# Patient Record
Sex: MALE | Race: BLACK OR AFRICAN AMERICAN | Employment: FULL TIME | URBAN - METROPOLITAN AREA
[De-identification: names, ages, dates, MRNs, and addresses within clinical notes are randomized per-mention and may not be internally consistent; named-entity substitution may affect disease eponyms.]

---

## 2017-05-10 ENCOUNTER — ANESTHESIA EVENT (OUTPATIENT)
Dept: SURGERY | Age: 67
End: 2017-05-10
Payer: COMMERCIAL

## 2017-05-11 ENCOUNTER — ANESTHESIA (OUTPATIENT)
Dept: SURGERY | Age: 67
End: 2017-05-11
Payer: COMMERCIAL

## 2017-05-11 ENCOUNTER — HOSPITAL ENCOUNTER (OUTPATIENT)
Age: 67
Setting detail: OUTPATIENT SURGERY
Discharge: HOME | End: 2017-05-11
Attending: UROLOGY | Admitting: UROLOGY
Payer: COMMERCIAL

## 2017-05-11 VITALS
WEIGHT: 173 LBS | SYSTOLIC BLOOD PRESSURE: 145 MMHG | HEIGHT: 67 IN | OXYGEN SATURATION: 100 % | HEART RATE: 52 BPM | BODY MASS INDEX: 27.15 KG/M2 | TEMPERATURE: 97.8 F | DIASTOLIC BLOOD PRESSURE: 70 MMHG | RESPIRATION RATE: 16 BRPM

## 2017-05-11 PROBLEM — N40.1 BPH (BENIGN PROSTATIC HYPERTROPHY) WITH URINARY OBSTRUCTION: Status: ACTIVE | Noted: 2017-05-11

## 2017-05-11 PROBLEM — N21.0 URINARY BLADDER CALCULUS: Status: ACTIVE | Noted: 2017-05-11

## 2017-05-11 PROBLEM — N13.8 BPH (BENIGN PROSTATIC HYPERTROPHY) WITH URINARY OBSTRUCTION: Status: ACTIVE | Noted: 2017-05-11

## 2017-05-11 PROCEDURE — 74011000258 HC RX REV CODE- 258

## 2017-05-11 PROCEDURE — 77030018836 HC SOL IRR NACL ICUM -A: Performed by: UROLOGY

## 2017-05-11 PROCEDURE — 77030018846 HC SOL IRR STRL H20 ICUM -A: Performed by: UROLOGY

## 2017-05-11 PROCEDURE — 74011250636 HC RX REV CODE- 250/636

## 2017-05-11 PROCEDURE — 77030018832 HC SOL IRR H20 ICUM -A: Performed by: UROLOGY

## 2017-05-11 PROCEDURE — 76210000021 HC REC RM PH II 0.5 TO 1 HR: Performed by: UROLOGY

## 2017-05-11 PROCEDURE — 76210000006 HC OR PH I REC 0.5 TO 1 HR: Performed by: UROLOGY

## 2017-05-11 PROCEDURE — 74011000250 HC RX REV CODE- 250

## 2017-05-11 PROCEDURE — 77030011640 HC PAD GRND REM COVD -A: Performed by: UROLOGY

## 2017-05-11 PROCEDURE — 76010000160 HC OR TIME 0.5 TO 1 HR INTENSV-TIER 1: Performed by: UROLOGY

## 2017-05-11 PROCEDURE — 77030032490 HC SLV COMPR SCD KNE COVD -B: Performed by: UROLOGY

## 2017-05-11 PROCEDURE — 74011250637 HC RX REV CODE- 250/637

## 2017-05-11 PROCEDURE — 77030013079 HC BLNKT BAIR HGGR 3M -A: Performed by: NURSE ANESTHETIST, CERTIFIED REGISTERED

## 2017-05-11 PROCEDURE — 76060000032 HC ANESTHESIA 0.5 TO 1 HR: Performed by: UROLOGY

## 2017-05-11 PROCEDURE — 82360 CALCULUS ASSAY QUANT: CPT | Performed by: UROLOGY

## 2017-05-11 PROCEDURE — 74011250636 HC RX REV CODE- 250/636: Performed by: NURSE ANESTHETIST, CERTIFIED REGISTERED

## 2017-05-11 PROCEDURE — 77030012510 HC MSK AIRWY LMA TELE -B: Performed by: NURSE ANESTHETIST, CERTIFIED REGISTERED

## 2017-05-11 PROCEDURE — 77030018706 HC CORD MPLR COVD -A: Performed by: UROLOGY

## 2017-05-11 RX ORDER — FENTANYL CITRATE 50 UG/ML
INJECTION, SOLUTION INTRAMUSCULAR; INTRAVENOUS AS NEEDED
Status: DISCONTINUED | OUTPATIENT
Start: 2017-05-11 | End: 2017-05-11 | Stop reason: HOSPADM

## 2017-05-11 RX ORDER — FAMOTIDINE 20 MG/1
TABLET, FILM COATED ORAL
Status: COMPLETED
Start: 2017-05-11 | End: 2017-05-11

## 2017-05-11 RX ORDER — HYDROMORPHONE HYDROCHLORIDE 2 MG/ML
0.5 INJECTION, SOLUTION INTRAMUSCULAR; INTRAVENOUS; SUBCUTANEOUS
Status: DISCONTINUED | OUTPATIENT
Start: 2017-05-11 | End: 2017-05-11

## 2017-05-11 RX ORDER — LEVOFLOXACIN 500 MG/1
500 TABLET, FILM COATED ORAL DAILY
Qty: 7 TAB | Refills: 0 | Status: SHIPPED | OUTPATIENT
Start: 2017-05-11 | End: 2017-05-18

## 2017-05-11 RX ORDER — MIDAZOLAM HYDROCHLORIDE 1 MG/ML
INJECTION, SOLUTION INTRAMUSCULAR; INTRAVENOUS AS NEEDED
Status: DISCONTINUED | OUTPATIENT
Start: 2017-05-11 | End: 2017-05-11 | Stop reason: HOSPADM

## 2017-05-11 RX ORDER — FAMOTIDINE 20 MG/1
20 TABLET, FILM COATED ORAL ONCE
Status: COMPLETED | OUTPATIENT
Start: 2017-05-12 | End: 2017-05-11

## 2017-05-11 RX ORDER — PROPOFOL 10 MG/ML
INJECTION, EMULSION INTRAVENOUS AS NEEDED
Status: DISCONTINUED | OUTPATIENT
Start: 2017-05-11 | End: 2017-05-11 | Stop reason: HOSPADM

## 2017-05-11 RX ORDER — LIDOCAINE HYDROCHLORIDE 20 MG/ML
INJECTION, SOLUTION EPIDURAL; INFILTRATION; INTRACAUDAL; PERINEURAL AS NEEDED
Status: DISCONTINUED | OUTPATIENT
Start: 2017-05-11 | End: 2017-05-11 | Stop reason: HOSPADM

## 2017-05-11 RX ORDER — OXYCODONE AND ACETAMINOPHEN 5; 325 MG/1; MG/1
1 TABLET ORAL
Qty: 10 TAB | Refills: 0 | Status: SHIPPED | OUTPATIENT
Start: 2017-05-11 | End: 2017-11-08 | Stop reason: ALTCHOICE

## 2017-05-11 RX ORDER — HYDROMORPHONE HYDROCHLORIDE 2 MG/ML
0.5 INJECTION, SOLUTION INTRAMUSCULAR; INTRAVENOUS; SUBCUTANEOUS
Status: DISCONTINUED | OUTPATIENT
Start: 2017-05-11 | End: 2017-05-11 | Stop reason: HOSPADM

## 2017-05-11 RX ORDER — ONDANSETRON 2 MG/ML
4 INJECTION INTRAMUSCULAR; INTRAVENOUS ONCE
Status: DISCONTINUED | OUTPATIENT
Start: 2017-05-11 | End: 2017-05-11 | Stop reason: HOSPADM

## 2017-05-11 RX ORDER — FENTANYL CITRATE 50 UG/ML
50 INJECTION, SOLUTION INTRAMUSCULAR; INTRAVENOUS
Status: DISCONTINUED | OUTPATIENT
Start: 2017-05-11 | End: 2017-05-11 | Stop reason: HOSPADM

## 2017-05-11 RX ORDER — SODIUM CHLORIDE, SODIUM LACTATE, POTASSIUM CHLORIDE, CALCIUM CHLORIDE 600; 310; 30; 20 MG/100ML; MG/100ML; MG/100ML; MG/100ML
50 INJECTION, SOLUTION INTRAVENOUS CONTINUOUS
Status: DISCONTINUED | OUTPATIENT
Start: 2017-05-12 | End: 2017-05-11 | Stop reason: HOSPADM

## 2017-05-11 RX ORDER — FENTANYL CITRATE 50 UG/ML
50 INJECTION, SOLUTION INTRAMUSCULAR; INTRAVENOUS AS NEEDED
Status: DISCONTINUED | OUTPATIENT
Start: 2017-05-11 | End: 2017-05-11

## 2017-05-11 RX ORDER — LEVOFLOXACIN 5 MG/ML
INJECTION, SOLUTION INTRAVENOUS AS NEEDED
Status: DISCONTINUED | OUTPATIENT
Start: 2017-05-11 | End: 2017-05-11 | Stop reason: HOSPADM

## 2017-05-11 RX ORDER — PHENAZOPYRIDINE HYDROCHLORIDE 100 MG/1
100 TABLET, FILM COATED ORAL
Qty: 12 TAB | Refills: 0 | Status: SHIPPED | OUTPATIENT
Start: 2017-05-11 | End: 2017-05-14

## 2017-05-11 RX ORDER — SODIUM CHLORIDE 0.9 % (FLUSH) 0.9 %
5-10 SYRINGE (ML) INJECTION EVERY 8 HOURS
Status: DISCONTINUED | OUTPATIENT
Start: 2017-05-11 | End: 2017-05-11 | Stop reason: HOSPADM

## 2017-05-11 RX ORDER — DOCUSATE SODIUM 100 MG/1
100 CAPSULE, LIQUID FILLED ORAL 2 TIMES DAILY
Qty: 60 CAP | Refills: 0 | Status: SHIPPED | OUTPATIENT
Start: 2017-05-11 | End: 2017-06-10

## 2017-05-11 RX ORDER — SODIUM CHLORIDE, SODIUM LACTATE, POTASSIUM CHLORIDE, CALCIUM CHLORIDE 600; 310; 30; 20 MG/100ML; MG/100ML; MG/100ML; MG/100ML
125 INJECTION, SOLUTION INTRAVENOUS CONTINUOUS
Status: DISCONTINUED | OUTPATIENT
Start: 2017-05-11 | End: 2017-05-11 | Stop reason: HOSPADM

## 2017-05-11 RX ORDER — TAMSULOSIN HYDROCHLORIDE 0.4 MG/1
0.8 CAPSULE ORAL
Qty: 180 CAP | Refills: 0 | Status: SHIPPED | OUTPATIENT
Start: 2017-05-11 | End: 2017-08-09

## 2017-05-11 RX ORDER — SODIUM CHLORIDE 0.9 % (FLUSH) 0.9 %
5-10 SYRINGE (ML) INJECTION AS NEEDED
Status: DISCONTINUED | OUTPATIENT
Start: 2017-05-11 | End: 2017-05-11 | Stop reason: HOSPADM

## 2017-05-11 RX ADMIN — MIDAZOLAM HYDROCHLORIDE 2 MG: 1 INJECTION, SOLUTION INTRAMUSCULAR; INTRAVENOUS at 12:22

## 2017-05-11 RX ADMIN — LIDOCAINE HYDROCHLORIDE 50 MG: 20 INJECTION, SOLUTION EPIDURAL; INFILTRATION; INTRACAUDAL; PERINEURAL at 12:27

## 2017-05-11 RX ADMIN — FAMOTIDINE 20 MG: 20 TABLET, FILM COATED ORAL at 10:56

## 2017-05-11 RX ADMIN — PROPOFOL 170 MG: 10 INJECTION, EMULSION INTRAVENOUS at 12:27

## 2017-05-11 RX ADMIN — FENTANYL CITRATE 50 MCG: 50 INJECTION, SOLUTION INTRAMUSCULAR; INTRAVENOUS at 12:51

## 2017-05-11 RX ADMIN — SODIUM CHLORIDE, SODIUM LACTATE, POTASSIUM CHLORIDE, AND CALCIUM CHLORIDE 50 ML/HR: 600; 310; 30; 20 INJECTION, SOLUTION INTRAVENOUS at 10:52

## 2017-05-11 RX ADMIN — FENTANYL CITRATE 50 MCG: 50 INJECTION, SOLUTION INTRAMUSCULAR; INTRAVENOUS at 12:27

## 2017-05-11 RX ADMIN — LEVOFLOXACIN 500 MG: 5 INJECTION, SOLUTION INTRAVENOUS at 12:27

## 2017-05-11 NOTE — DISCHARGE INSTRUCTIONS
Discharge Instructions      Patient: Elli Dozier               Sex: male          DOA: 5/11/2017         YOB: 1950      Age:  61 y.o.        LOS:  LOS: 0 days     ACUTE DIAGNOSES:  bladder stone n21.1    CHRONIC MEDICAL DIAGNOSES:  Problem List as of 5/11/2017  Date Reviewed: 4/30/2017          Codes Class Noted - Resolved    BPH (benign prostatic hypertrophy) with urinary obstruction ICD-10-CM: N40.1, N13.8  ICD-9-CM: 600.01, 599.69  5/11/2017 - Present        Urinary bladder calculus ICD-10-CM: N21.0  ICD-9-CM: 594.1  5/11/2017 - Present        Chronic hepatitis C ([facility] [address] ICD-10-CM: B18.2  ICD-9-CM: 070.54  12/21/2011 - Present    Overview Signed 12/21/2011 10:25 AM by Goran Hayes MD     Genotype 1                   DISCHARGE MEDICATIONS:       · It is important that you take the medication exactly as they are prescribed. · Keep your medication in the bottles provided by the pharmacist and keep a list of the medication names, dosages, and times to be taken in your wallet. · Do not take other medications without consulting your doctor. DIET:  Regular Diet    ACTIVITY: No lifting, Driving, or Strenuous exercise for 6 days    ADDITIONAL INFORMATION: If you experience any of the following symptoms then please call your primary care physician or return to the emergency room if you cannot get hold of your doctor: Fever, chills, nausea, vomiting, diarrhea, change in mentation, falling, bleeding, shortness of breath. FOLLOW UP CARE:  Dr. Sukumar Whittaker MD as regularly scheduled. Follow-up with Ryan Becerril MD in 10 days for recheck. Information obtained by :  I understand that if any problems occur once I am at home I am to contact my physician. I understand and acknowledge receipt of the instructions indicated above.                                                                                                                                            Physician's or R.N.'s Signature                                                                  Date/Time                                                                                                                                                Patient or Representative Signature                                                          Date/Time      DISCHARGE SUMMARY from Nurse    The following personal items are in your possession at time of discharge:    Dental Appliances: None  Visual Aid: Glasses     Home Medications: None  Jewelry: None  Clothing: Pants, Shirt, Footwear, Socks, Undergarments  Other Valuables: Eyeglasses (everything given to wife)             PATIENT INSTRUCTIONS:    After general anesthesia or intravenous sedation, for 24 hours or while taking prescription Narcotics:  · Limit your activities  · Do not drive and operate hazardous machinery  · Do not make important personal or business decisions  · Do  not drink alcoholic beverages  · If you have not urinated within 8 hours after discharge, please contact your surgeon on call. Report the following to your surgeon:  · Excessive pain, swelling, redness or odor of or around the surgical area  · Temperature over 100.5  · Nausea and vomiting lasting longer than 4 hours or if unable to take medications  · Any signs of decreased circulation or nerve impairment to extremity: change in color, persistent  numbness, tingling, coldness or increase pain  · Any questions        What to do at Home:  These are general instructions for a healthy lifestyle:    No smoking/ No tobacco products/ Avoid exposure to second hand smoke    Surgeon General's Warning:  Quitting smoking now greatly reduces serious risk to your health.     Obesity, smoking, and sedentary lifestyle greatly increases your risk for illness    A healthy diet, regular physical exercise & weight monitoring are important for maintaining a healthy lifestyle    You may be retaining fluid if you have a history of heart failure or if you experience any of the following symptoms:  Weight gain of 3 pounds or more overnight or 5 pounds in a week, increased swelling in our hands or feet or shortness of breath while lying flat in bed. Please call your doctor as soon as you notice any of these symptoms; do not wait until your next office visit. Recognize signs and symptoms of STROKE:    F-face looks uneven    A-arms unable to move or move unevenly    S-speech slurred or non-existent    T-time-call 911 as soon as signs and symptoms begin-DO NOT go       Back to bed or wait to see if you get better-TIME IS BRAIN. Warning Signs of HEART ATTACK     Call 911 if you have these symptoms:   Chest discomfort. Most heart attacks involve discomfort in the center of the chest that lasts more than a few minutes, or that goes away and comes back. It can feel like uncomfortable pressure, squeezing, fullness, or pain.  Discomfort in other areas of the upper body. Symptoms can include pain or discomfort in one or both arms, the back, neck, jaw, or stomach.  Shortness of breath with or without chest discomfort.  Other signs may include breaking out in a cold sweat, nausea, or lightheadedness. Don't wait more than five minutes to call 911 - MINUTES MATTER! Fast action can save your life. Calling 911 is almost always the fastest way to get lifesaving treatment. Emergency Medical Services staff can begin treatment when they arrive -- up to an hour sooner than if someone gets to the hospital by car. The discharge information has been reviewed with the patient. The patient verbalized understanding. Discharge medications reviewed with the patient and appropriate educational materials and side effects teaching were provided. Patient armband removed and given to patient to take home.   Patient was informed of the privacy risks if armband lost or stolen

## 2017-05-11 NOTE — ANESTHESIA POSTPROCEDURE EVALUATION
Post-Anesthesia Evaluation & Assessment    Visit Vitals    /71    Pulse (!) 50    Temp 36.6 °C (97.8 °F)    Resp 13    Ht 5' 7\" (1.702 m)    Wt 78.5 kg (173 lb)    SpO2 98%    BMI 27.1 kg/m2       Post-operative hydration adequate. Pain score (VAS): 0 Pain Scale 1: Numeric (0 - 10) (05/11/17 1330)  Pain Intensity 1: 0 (05/11/17 1330)   Managed. Mental status & Level of consciousness: returned to baseline    Neurological status: returned to baseline    Pulmonary status: airway patent, oxygen given as needed. Complications related to anesthesia: none    Patient has met all discharge requirements.     Additional comments:        Lowell Purdy MD  May 11, 2017

## 2017-05-11 NOTE — BRIEF OP NOTE
BRIEF OPERATIVE NOTE    Date of Procedure: 5/11/2017   Preoperative Diagnosis: bladder stone n21.1  Postoperative Diagnosis: bladder stone n21.1    Procedure(s):  Cystoscopy with  Cystolitholapaxy using Holmium laser. Fulguration of prostate bleeding. Surgeon(s) and Role:     * Demario Yost MD - Primary         Surgical Staff:  Circ-1: Toan Meza RN  Scrub Tech-1: Griffin Koyanagi  Event Time In   Incision Start 12:38 PM   Incision Close 12:55 PM     Anesthesia: General   Estimated Blood Loss: minimal  Specimens:   ID Type Source Tests Collected by Time Destination   1 : BLADDER STONES FOR ANALYSIS  Bladder  Demario Yost MD 5/11/2017  1:03 PM Pathology      Findings: large bilobar prostatic hypertrophy with obstruction, Trigone and ureteral orifices seen readily. 2+ bladder trabeculations with no bladder tumors. Elongated 3 cm bladder stone well fragmented with Holmium laser 5.4 micron laser fiber fiber. All fragments were completely irrigated out and no fragments seen after termination of the procedure. There was some friable prostate tissue on small median lobe and lateral lobes at bladder neck. These bleeders we fulgurated and no active bleeding noted after fulguration.   Complications: none    Demario Yost MD    Implants: * No implants in log *

## 2017-05-11 NOTE — IP AVS SNAPSHOT
[address] [place] [address] [address] 
[phone] Patient: Randolph Hernandez MRN: [id number] AGUILA:[birth year] You are allergic to the following Allergen Reactions Ancef (Cefazolin) Hives Swells Recent Documentation Height Weight BMI Smoking Status 1.702 m 78.5 kg 27.1 kg/m2 Former Smoker Emergency Contacts Name Discharge Info Relation Home Work Mobile Radha Hanson DISCHARGE CAREGIVER [3] Spouse [3] [phone] About your hospitalization You were admitted on:  May 11, 2017 You last received care in the:  [facility] PHASE 2 RECOVERY You were discharged on:  May 11, 2017 Unit phone number:  [phone] Why you were hospitalized Your primary diagnosis was:  Not on File Your diagnoses also included:  Urinary Bladder Calculus Providers Seen During Your Hospitalizations Provider Role Specialty Primary office phone Reymundo Bernard MD Attending Provider Urology [phone] Your Primary Care Physician (PCP) Primary Care Physician Office Phone Office Fax [address]  [phone] Follow-up Information Follow up With Details Comments Contact Info [facility] [address] [place] [address] [phone] Your Appointments Wednesday May 31, 2017  9:45 AM EDT Any with Fouzia Dent MD  
[facility] [place] [address] [place] [address]  
[phone] Current Discharge Medication List  
  
START taking these medications Dose & Instructions Dispensing Information Comments Morning Noon Evening Bedtime  
 docusate sodium 100 mg capsule Commonly known as:  Sandra Pulido Your last dose was:     
   
Your next dose is:    
   
   
 Dose:  100 mg  
 Take 1 Cap by mouth two (2) times a day for 30 days. Quantity:  60 Cap Refills:  0  
     
   
   
   
  
 levoFLOXacin 500 mg tablet Commonly known as:  James Clement Your last dose was: Your next dose is:    
   
   
 Dose:  500 mg Take 1 Tab by mouth daily for 7 days. Quantity:  7 Tab Refills:  0  
     
   
   
   
  
 oxyCODONE-acetaminophen 5-325 mg per tablet Commonly known as:  PERCOCET Your last dose was: Your next dose is:    
   
   
 Dose:  1 Tab Take 1 Tab by mouth every four (4) hours as needed for Pain. Max Daily Amount: 6 Tabs. Quantity:  10 Tab Refills:  0 phenazopyridine 100 mg tablet Commonly known as:  PYRIDIUM Your last dose was: Your next dose is:    
   
   
 Dose:  100 mg Take 1 Tab by mouth three (3) times daily as needed for Pain for up to 3 days. Quantity:  12 Tab Refills:  0 CONTINUE these medications which have CHANGED Dose & Instructions Dispensing Information Comments Morning Noon Evening Bedtime  
 tamsulosin 0.4 mg capsule Commonly known as:  FLOMAX What changed:   
- how much to take 
- how to take this - when to take this Your last dose was: Your next dose is:    
   
   
 Dose:  0.8 mg Take 2 Caps by mouth daily (after dinner) for 90 days. Quantity:  180 Cap Refills:  0 CONTINUE these medications which have NOT CHANGED Dose & Instructions Dispensing Information Comments Morning Noon Evening Bedtime FISH -160-1,000 mg Cap Generic drug:  omega 3-dha-epa-fish oil Your last dose was: Your next dose is: Take  by mouth. Refills:  0  
     
   
   
   
  
 VITAMIN D3 1,000 unit tablet Generic drug:  cholecalciferol Your last dose was: Your next dose is: Take  by mouth daily. Refills:  0 Where to Get Your Medications Information on where to get these meds will be given to you by the nurse or doctor. ! Ask your nurse or doctor about these medications  
  docusate sodium 100 mg capsule  
 levoFLOXacin 500 mg tablet  
 oxyCODONE-acetaminophen 5-325 mg per tablet  
 phenazopyridine 100 mg tablet  
 tamsulosin 0.4 mg capsule Discharge Instructions Discharge Instructions Patient: Fabiano Cummings               Sex: male          DOA: 5/11/2017 YOB: 1950      Age:  61 y.o.        LOS:  LOS: 0 days ACUTE DIAGNOSES: 
bladder stone n21.1 CHRONIC MEDICAL DIAGNOSES: 
Problem List as of 5/11/2017  Date Reviewed: 4/30/2017 Codes Class Noted - Resolved BPH (benign prostatic hypertrophy) with urinary obstruction ICD-10-CM: N40.1, N13.8 ICD-9-CM: 600.01, 599.69  5/11/2017 - Present Urinary bladder calculus ICD-10-CM: N21.0 ICD-9-CM: 594.1  5/11/2017 - Present [facility] ICD-10-CM: B18.2 ICD-9-CM: 070.54  12/21/2011 - Present Overview Signed 12/21/2011 10:25 AM by Arely Crowe MD  
  Genotype 1 DISCHARGE MEDICATIONS:  
 
 
· It is important that you take the medication exactly as they are prescribed. · Keep your medication in the bottles provided by the pharmacist and keep a list of the medication names, dosages, and times to be taken in your wallet. · Do not take other medications without consulting your doctor. DIET:  Regular Diet ACTIVITY: No lifting, Driving, or Strenuous exercise for 6 days ADDITIONAL INFORMATION: If you experience any of the following symptoms then please call your primary care physician or return to the emergency room if you cannot get hold of your doctor: Fever, chills, nausea, vomiting, diarrhea, change in mentation, falling, bleeding, shortness of breath. FOLLOW UP CARE: 
Dr. Chantale Hartman MD as regularly scheduled. Follow-up with Yaritza Horne MD in 10 days for recheck. Information obtained by : 
I understand that if any problems occur once I am at home I am to contact my physician. I understand and acknowledge receipt of the instructions indicated above. Physician's or R.N.'s Signature                                                                  Date/Time Patient or Representative Signature                                                          Date/Time DISCHARGE SUMMARY from Nurse The following personal items are in your possession at time of discharge: 
 
Dental Appliances: None Visual Aid: Glasses Home Medications: None Jewelry: None Clothing: Pants, Shirt, Footwear, Socks, Undergarments Other Valuables: Eyeglasses (everything given to wife) PATIENT INSTRUCTIONS: 
 
After general anesthesia or intravenous sedation, for 24 hours or while taking prescription Narcotics: · Limit your activities · Do not drive and operate hazardous machinery · Do not make important personal or business decisions · Do  not drink alcoholic beverages · If you have not urinated within 8 hours after discharge, please contact your surgeon on call. Report the following to your surgeon: 
· Excessive pain, swelling, redness or odor of or around the surgical area · Temperature over 100.5 · Nausea and vomiting lasting longer than 4 hours or if unable to take medications · Any signs of decreased circulation or nerve impairment to extremity: change in color, persistent  numbness, tingling, coldness or increase pain · Any questions What to do at Home: These are general instructions for a healthy lifestyle: No smoking/ No tobacco products/ Avoid exposure to second hand smoke Surgeon General's Warning:  Quitting smoking now greatly reduces serious risk to your health. Obesity, smoking, and sedentary lifestyle greatly increases your risk for illness A healthy diet, regular physical exercise & weight monitoring are important for maintaining a healthy lifestyle You may be retaining fluid if you have a history of heart failure or if you experience any of the following symptoms:  Weight gain of 3 pounds or more overnight or 5 pounds in a week, increased swelling in our hands or feet or shortness of breath while lying flat in bed. Please call your doctor as soon as you notice any of these symptoms; do not wait until your next office visit. Recognize signs and symptoms of STROKE: 
 
F-face looks uneven A-arms unable to move or move unevenly S-speech slurred or non-existent T-time-call 911 as soon as signs and symptoms begin-DO NOT go Back to bed or wait to see if you get better-TIME IS BRAIN. Warning Signs of HEART ATTACK Call 911 if you have these symptoms: 
? Chest discomfort. Most heart attacks involve discomfort in the center of the chest that lasts more than a few minutes, or that goes away and comes back. It can feel like uncomfortable pressure, squeezing, fullness, or pain. ? Discomfort in other areas of the upper body. Symptoms can include pain or discomfort in one or both arms, the back, neck, jaw, or stomach. ? Shortness of breath with or without chest discomfort. ? Other signs may include breaking out in a cold sweat, nausea, or lightheadedness. Don't wait more than five minutes to call [address]! Fast action can save your life. Calling 911 is almost always the fastest way to get lifesaving treatment. Emergency Medical Services staff can begin treatment when they arrive  up to an hour sooner than if someone gets to the hospital by car. The discharge information has been reviewed with the patient. The patient verbalized understanding. Discharge medications reviewed with the patient and appropriate educational materials and side effects teaching were provided. Patient armband removed and given to patient to take home. Patient was informed of the privacy risks if armband lost or stolen Discharge Orders None Introducing [facility]! Jessikahilario Grijalva introduces [employer] patient portal. Now you can access parts of your medical record, email your doctor's office, and request medication refills online. 1. In your internet browser, go to [email or web address] 2. Click on the First Time User? Click Here link in the Sign In box. You will see the New Member Sign Up page. 3. Enter your [employer] Access Code exactly as it appears below. You will not need to use this code after youve completed the sign-up process. If you do not sign up before the expiration date, you must request a new code. · [employer] Access Code: [email or web address] Expires: 6/28/2017 10:55 AM 
 
4. Enter the last four digits of your Social Security Number (xxxx) and Date of Birth (mm/dd/yyyy) as indicated and click Submit. You will be taken to the next sign-up page. 5. Create a [employer] ID. This will be your [employer] login ID and cannot be changed, so think of one that is secure and easy to remember. 6. Create a [employer] password. You can change your password at any time. 7. Enter your Password Reset Question and Answer. This can be used at a later time if you forget your password. 8. Enter your e-mail address. You will receive e-mail notification when new information is available in [address] 9. Click Sign Up. You can now view and download portions of your medical record. 10. Click the Download Summary menu link to download a portable copy of your medical information. If you have questions, please visit the Frequently Asked Questions section of the MyChart website. Remember, MyChart is NOT to be used for urgent needs. For medical emergencies, dial 911. Now available from your iPhone and Android! General Information Please provide this summary of care documentation to your next provider. Patient Signature:  ____________________________________________________________ Date:  ____________________________________________________________  
  
Gatha Cyphers Provider Signature:  ____________________________________________________________ Date:  ____________________________________________________________

## 2017-05-11 NOTE — ANESTHESIA PREPROCEDURE EVALUATION
Anesthetic History   No history of anesthetic complications            Review of Systems / Medical History  Patient summary reviewed and pertinent labs reviewed    Pulmonary  Within defined limits                 Neuro/Psych   Within defined limits           Cardiovascular                  Exercise tolerance: >4 METS  Comments: Bladder stones   GI/Hepatic/Renal                Endo/Other             Other Findings   Comments:   Risk Factors for Postoperative nausea/vomiting:       History of postoperative nausea/vomiting? NO       Female? NO       Motion sickness? NO       Intended opioid administration for postoperative analgesia? NO      Smoking Abstinence  Current Smoker? NO  Elective Surgery? YES  Seen preoperatively by anesthesiologist or proxy prior to day of surgery? YES  Pt abstained from smoking 24 hours prior to anesthesia?  N/A           Physical Exam    Airway  Mallampati: II  TM Distance: 4 - 6 cm  Neck ROM: normal range of motion   Mouth opening: Normal     Cardiovascular  Regular rate and rhythm,  S1 and S2 normal,  no murmur, click, rub, or gallop             Dental  No notable dental hx       Pulmonary  Breath sounds clear to auscultation               Abdominal  GI exam deferred       Other Findings            Anesthetic Plan    ASA: 2  Anesthesia type: general          Induction: Intravenous  Anesthetic plan and risks discussed with: Patient

## 2017-05-12 NOTE — OP NOTES
Jim Meyer    Name:  Jesus Pereyra  MR#:  [id number]  :  1950  Account #:  [de-identified]  Date of Adm:  2017  Date of Surgery:  2017      PREOPERATIVE DIAGNOSES  1. BPH, with obstruction. 2. Bladder calculus, large at 3 cm. POSTOPERATIVE DIAGNOSES  1. BPH, with obstruction. 2. Bladder calculus, large at 3 cm. PROCEDURES PERFORMED:  Cystoscopy with cystolithalopaxy  utilizing holmium laser ablation and fulguration of some prostate  bleeding. SURGEON:  Leopoldo Cease, M.D. [address] [place] [address]    ESTIMATED BLOOD LOSS:  Minimal.    SPECIMENS REMOVED:  Bladder stones for analysis. ANESTHESIA:  General.    INCISION START TIME:  12:38 p.m. INCISION CLOSE TIME:  1:15 p.m. INTRAOPERATIVE FINDINGS:  There was a large bilobar prostatic  hypertrophy, with obstruction of the trigone and urethra readily seen. Also, 2+ bladder trabeculations were noted, with no bladder tumors. There was an elongated 3 cm bladder stone, which was fragmented  well with 5.4 micron holmium laser fiber. All fragments were  completely irrigated out of the bladder, and there were no fragments  seen after the procedure was completed. There was some friable  prostate tissue that had been bleeding in the small median lobe and  some of the lateral lobe tissues. Any bleeders were fulgurated, and  there was no active bleeding noted at the termination of the  procedure. Therefore, a Mays catheter was not left in place, and the  patient will undergo a voiding trial in the recovery room. COMPLICATIONS:  None. IMPLANTS:  None. INDICATIONS:  This is a 82-year-old male who presented with  significant lower urinary tract symptoms. The patient was nearly  unable to urinate when he was in the emergency room. He was felt to  have prostatitis.   He had some improvement on antibiotic therapy, but  still had significant lower urinary tract symptoms, with frequency,  urgency, and poor urine stream.  The patient was seen in the office by  Zeeshan Underwood. Jose Nj M.D. An office cystoscopy revealed a large  bilobar lateral lobe hypertrophy of the prostate. No strictures were  noted, and a large bladder stone. The patient now presents for  treatment of his bladder stones today. He will later require formal  treatment with TVT or TURP of the enlarged obstructing lateral lobes  of the prostate. PROCEDURE IN DETAIL:  The patient was identified in the holding  room. He was given informed consent. He was then taken to the  cystoscopy suite and placed on the cystoscopy table in supine  position. After adequate general anesthesia, the patient was placed in  dorsal lithotomy position, appropriately padded, and prepped and  draped in the usual sterile fashion for a cystoscopy. A time-out was  taken. All were in agreement on the procedure. Burn precautions and  beta blocker concerns were addressed. SCDs were placed for DVT  prophylaxis, and appropriate parenteral prophylactic antibiotics were  administered. Urine culture was previously negative preoperatively. The patient underwent cystoscopy using the 21-Cymro cystoscope  sheath, 30 and 70 lenses. The above findings were noted. We then  identified the bladder. Using the 5.4 micron laser fiber, we initiated  laser fragmentation of the large 3 cm stone. There was a break into  multiple small pieces. Then, after this was completed, I was able to  irrigate out the bladder stone without difficulty using a Jacqui syringe  and the 21-Cymro obturator sheath. I reinspected the bladder and  saw no further stones. The ureteral orifices again were readily noted. In the bladder neck, there was some bleeding and in the small median  lobe of the prostate. I fulgurated this area of this bleeding. Significantly also, I found some bleeding at the lateral lobes bilaterally.   Once this was done, there was no further bleeding. The cystoscope  was removed as there was no further bleeding. The patient tolerated  the procedure well. He will undergo a voiding trial when he wakes up  in the recovery room. As long as he is able to void, he will be  discharged.         MD Gina Avendano / Joe W Calixto Roblero  D:  05/11/2017   22:54  T:  05/12/2017   00:06  Job #:  [id number]

## 2017-05-18 LAB
CA PHOS MFR STONE: 5 %
COLOR STONE: NORMAL
COM MFR STONE: 95 %
COMMENT, 519994: NORMAL
COMPOSITION, 120440: NORMAL
DISCLAIMER, STO32L: NORMAL
NIDUS STONE QL: NORMAL
SIZE STONE: NORMAL MM
WT STONE: 350 MG

## (undated) DEVICE — SYRINGE MED 20ML STD CLR PLAS LUERLOCK TIP N CTRL DISP

## (undated) DEVICE — SOLUTION IRRIG 3000ML 0.9% SOD CHL FLX CONT 0797208] ICU MEDICAL INC]

## (undated) DEVICE — STERILE LATEX POWDER-FREE SURGICAL GLOVESWITH NITRILE COATING: Brand: PROTEXIS

## (undated) DEVICE — SOLUTION IRRIG 3000ML H2O STRL BAG

## (undated) DEVICE — SOLUTION IV 1000ML 0.9% SOD CHL

## (undated) DEVICE — TRAY PREP DRY W/ PREM GLV 2 APPL 6 SPNG 2 UNDPD 1 OVERWRAP

## (undated) DEVICE — KENDALL SCD EXPRESS SLEEVES, KNEE LENGTH, MEDIUM: Brand: KENDALL SCD

## (undated) DEVICE — SOLUTION IRRIG 1000ML H2O STRL BLT

## (undated) DEVICE — Device

## (undated) DEVICE — CONTAINER DRN 20L DISP FLUIDRN LLS

## (undated) DEVICE — SYR IRR CATH TIP LR ADPT 70ML -- CONVERT TO ITEM 363120

## (undated) DEVICE — SPONGE GZ W4XL4IN COT 12 PLY TYP VII WVN C FLD DSGN

## (undated) DEVICE — REM POLYHESIVE ADULT PATIENT RETURN ELECTRODE: Brand: VALLEYLAB

## (undated) DEVICE — MONOPOLAR CORD: Brand: VALLEYLAB

## (undated) DEVICE — BAG DRNGE NONSTERILE W/ SUCT HOSE CYSTO/UROLOGICAL FOR GE